# Patient Record
Sex: MALE | Race: WHITE | NOT HISPANIC OR LATINO | URBAN - NONMETROPOLITAN AREA
[De-identification: names, ages, dates, MRNs, and addresses within clinical notes are randomized per-mention and may not be internally consistent; named-entity substitution may affect disease eponyms.]

---

## 2017-05-04 PROBLEM — Z96.1: Noted: 2021-02-19

## 2017-05-04 PROBLEM — H40.213: Noted: 2021-05-14

## 2017-05-04 PROBLEM — H26.491: Noted: 2021-02-19

## 2017-05-04 PROBLEM — H35.363: Noted: 2021-02-19

## 2017-05-04 PROBLEM — H18.421: Noted: 2017-05-04

## 2017-05-04 PROBLEM — H40.213: Noted: 2021-02-19

## 2017-05-04 PROBLEM — E11.9: Noted: 2021-02-19

## 2017-05-04 PROBLEM — H26.492: Noted: 2021-05-14

## 2017-05-04 PROBLEM — E11.9: Noted: 2021-05-14

## 2017-05-04 PROBLEM — H17.89: Noted: 2021-02-19

## 2021-02-18 ENCOUNTER — IMPORTED ENCOUNTER (OUTPATIENT)
Dept: URBAN - NONMETROPOLITAN AREA CLINIC 1 | Facility: CLINIC | Age: 76
End: 2021-02-18

## 2021-02-18 PROCEDURE — 66821 AFTER CATARACT LASER SURGERY: CPT

## 2021-02-18 PROCEDURE — 92004 COMPRE OPH EXAM NEW PT 1/>: CPT

## 2021-05-11 ENCOUNTER — IMPORTED ENCOUNTER (OUTPATIENT)
Dept: URBAN - NONMETROPOLITAN AREA CLINIC 1 | Facility: CLINIC | Age: 76
End: 2021-05-11

## 2021-05-11 PROCEDURE — 92083 EXTENDED VISUAL FIELD XM: CPT

## 2021-05-11 PROCEDURE — 92133 CPTRZD OPH DX IMG PST SGM ON: CPT

## 2021-05-11 NOTE — PATIENT DISCUSSION
VF 24-2 & ONH 5/11/21PCO-Explained PCO and RBAs of YAG Capsulotomy to pt. -Pt elects to proceed. YAG Caps OD today DM s DR-Stressed the importance of keeping blood sugars under control blood pressure under control and weight normalization and regular visits with PCP. -Explained the possible effects of poorly controlled diabetes and the damage that diabetes can cause to ocular health. -Patient to check HgbA1C.-Pt instructed to contact our office with any vision changes. ACG Discussed w.pt IOP 18:08 02/18/21Continue Combigan bid OU and Latanoprost qhs OU Stressed the importance of compliance Order VF and OCT ONH Continue to monitor Drusen Discussed w/pt Continue to monitorBand Keratopathy/Corneal scar Discussed w/pt Continue to monitor

## 2021-05-13 ENCOUNTER — IMPORTED ENCOUNTER (OUTPATIENT)
Dept: URBAN - NONMETROPOLITAN AREA CLINIC 1 | Facility: CLINIC | Age: 76
End: 2021-05-13

## 2021-05-13 PROCEDURE — 92012 INTRM OPH EXAM EST PATIENT: CPT

## 2021-05-13 NOTE — PATIENT DISCUSSION
ACG Discussed w.pt IOP 12:07 05/13/21Continue Combigan bid OD Stressed the importance of compliance VF and OCT ONH reviewed w/ptContinue to monitor DM s DR-Stressed the importance of keeping blood sugars under control blood pressure under control and weight normalization and regular visits with PCP. -Explained the possible effects of poorly controlled diabetes and the damage that diabetes can cause to ocular health. -Patient to check HgbA1C.-Pt instructed to contact our office with any vision changes. Drusen Discussed w/pt Continue to monitorBand Keratopathy/Corneal scar Discussed w/pt Continue to monitor

## 2022-04-09 ASSESSMENT — VISUAL ACUITY
OD_CC: 20/40-2
OS_CC: 20/400
OD_CC: 20/30
OS_CC: CF4'
OS_PH: 20/100

## 2022-04-09 ASSESSMENT — TONOMETRY
OS_IOP_MMHG: 08
OD_IOP_MMHG: 18
OS_IOP_MMHG: 07
OD_IOP_MMHG: 12